# Patient Record
Sex: FEMALE | Race: ASIAN
[De-identification: names, ages, dates, MRNs, and addresses within clinical notes are randomized per-mention and may not be internally consistent; named-entity substitution may affect disease eponyms.]

---

## 2017-08-27 ENCOUNTER — HOSPITAL ENCOUNTER (EMERGENCY)
Dept: HOSPITAL 4 - SED | Age: 57
Discharge: HOME | End: 2017-08-27
Payer: MEDICAID

## 2017-08-27 VITALS — HEIGHT: 67 IN | WEIGHT: 135 LBS | BODY MASS INDEX: 21.19 KG/M2

## 2017-08-27 VITALS — SYSTOLIC BLOOD PRESSURE: 129 MMHG

## 2017-08-27 VITALS — SYSTOLIC BLOOD PRESSURE: 124 MMHG
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
## 2017-08-27 DIAGNOSIS — S60.212A: Primary | ICD-10-CM
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
## 2017-08-27 DIAGNOSIS — R03.0: ICD-10-CM
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
## 2017-08-27 PROCEDURE — 29125 APPL SHORT ARM SPLINT STATIC: CPT

## 2017-08-27 PROCEDURE — 99284 EMERGENCY DEPT VISIT MOD MDM: CPT

## 2017-08-27 PROCEDURE — 96372 THER/PROPH/DIAG INJ SC/IM: CPT

## 2017-08-27 PROCEDURE — 73110 X-RAY EXAM OF WRIST: CPT

## 2017-08-27 NOTE — NUR
Patient given written and verbal discharge instructions and verbalizes 
understanding.  ER MD discussed with patient the results and treatment 
provided. Patient in stable condition. ID arm band removed. Rx of Motrin given. 
Patient educated on pain management and to follow up with PMD. Pain Scale 2/10 
tolerable to patient. Opportunity for questions provided and answered.

## 2017-08-27 NOTE — NUR
[facility]  at bedside, Patient AOx4,ambulatory, presents to ED with 
complaint of left wrist pain s/p fall. Patient states she slipped on water and 
fell on hard floor surface. No open areas noted. Discoloration noted to lateral 
aspect of wrist and forearm.

## 2018-02-23 ENCOUNTER — HOSPITAL ENCOUNTER (EMERGENCY)
Dept: HOSPITAL 4 - SED | Age: 58
Discharge: HOME | End: 2018-02-23
Payer: MEDICAID

## 2018-02-23 ENCOUNTER — HOSPITAL ENCOUNTER (EMERGENCY)
Dept: HOSPITAL 4 - SED | Age: 58
Discharge: LEFT BEFORE DISCHARGE | End: 2018-02-23
Payer: MEDICAID

## 2018-02-23 VITALS — HEIGHT: 66 IN | BODY MASS INDEX: 20.89 KG/M2 | WEIGHT: 130 LBS

## 2018-02-23 VITALS — SYSTOLIC BLOOD PRESSURE: 118 MMHG

## 2018-02-23 VITALS — SYSTOLIC BLOOD PRESSURE: 115 MMHG

## 2018-02-23 VITALS — SYSTOLIC BLOOD PRESSURE: 114 MMHG

## 2018-02-23 VITALS — BODY MASS INDEX: 21.69 KG/M2 | HEIGHT: 66 IN | WEIGHT: 135 LBS

## 2018-02-23 DIAGNOSIS — Z88.2: ICD-10-CM

## 2018-02-23 DIAGNOSIS — F17.200: ICD-10-CM

## 2018-02-23 DIAGNOSIS — Z53.21: ICD-10-CM

## 2018-02-23 DIAGNOSIS — R05: ICD-10-CM

## 2018-02-23 DIAGNOSIS — J02.9: Primary | ICD-10-CM

## 2018-02-23 DIAGNOSIS — R50.9: ICD-10-CM

## 2018-02-23 DIAGNOSIS — J06.9: Primary | ICD-10-CM

## 2018-02-23 NOTE — NUR
Patient given written and verbal discharge instructions and verbalizes 
understanding.  ER MD discussed with patient the results and treatment 
provided. Patient in stable condition. ID arm band removed. Rx of Promethazine 
with codeine and prednisone given. Patient educated on pain management and to 
follow up with PMD. Pain Scale 3/10 tolerable for patient. Opportunity for 
questions provided and answered. Medication side effect fact sheet provided.

## 2018-02-23 NOTE — NUR
Patient arrived to ED a/o x 4 with flu-like symptoms. Reports cough, body aches 
and generalized malaise x 2 days. Afebrile at this time. Wheezes noted 
bilaterally. Respirations even and unlabored. Skin warm and dry. Did not take 
any medication at home. Will continue to monitor.